# Patient Record
Sex: FEMALE | NOT HISPANIC OR LATINO | Employment: PART TIME | URBAN - METROPOLITAN AREA
[De-identification: names, ages, dates, MRNs, and addresses within clinical notes are randomized per-mention and may not be internally consistent; named-entity substitution may affect disease eponyms.]

---
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
## 2022-05-16 RX ORDER — OMEPRAZOLE 40 MG/1
CAPSULE, DELAYED RELEASE ORAL
Qty: 90 CAPSULE | Refills: 1 | Status: SHIPPED | OUTPATIENT
Start: 2022-05-16 | End: 2022-12-08

## 2022-11-28 ENCOUNTER — OFFICE VISIT (OUTPATIENT)
Dept: FAMILY MEDICINE CLINIC | Facility: CLINIC | Age: 22
End: 2022-11-28

## 2022-11-28 VITALS
BODY MASS INDEX: 21.12 KG/M2 | SYSTOLIC BLOOD PRESSURE: 104 MMHG | DIASTOLIC BLOOD PRESSURE: 66 MMHG | HEART RATE: 118 BPM | WEIGHT: 100.6 LBS | TEMPERATURE: 98 F | OXYGEN SATURATION: 100 % | HEIGHT: 58 IN

## 2022-11-28 DIAGNOSIS — H66.003 NON-RECURRENT ACUTE SUPPURATIVE OTITIS MEDIA OF BOTH EARS WITHOUT SPONTANEOUS RUPTURE OF TYMPANIC MEMBRANES: Primary | ICD-10-CM

## 2022-11-28 PROCEDURE — 99213 OFFICE O/P EST LOW 20 MIN: CPT | Performed by: NURSE PRACTITIONER

## 2022-11-28 RX ORDER — BROMPHENIRAMINE MALEATE, PSEUDOEPHEDRINE HYDROCHLORIDE, AND DEXTROMETHORPHAN HYDROBROMIDE 2; 30; 10 MG/5ML; MG/5ML; MG/5ML
SYRUP ORAL
COMMUNITY
Start: 2022-11-18 | End: 2022-12-08

## 2022-11-28 RX ORDER — BENZONATATE 100 MG/1
100 CAPSULE ORAL 3 TIMES DAILY PRN
Qty: 90 CAPSULE | Refills: 0 | Status: SHIPPED | OUTPATIENT
Start: 2022-11-28 | End: 2022-12-08

## 2022-11-28 RX ORDER — CEFDINIR 300 MG/1
300 CAPSULE ORAL 2 TIMES DAILY
Qty: 20 CAPSULE | Refills: 0 | Status: SHIPPED | OUTPATIENT
Start: 2022-11-28 | End: 2022-12-08

## 2022-11-28 NOTE — PROGRESS NOTES
"Chief Complaint  Earache (X4D. B. Sore throat. Ear drainage. Dx Flu 10D ago.)    Subjective        Latrice Sundip Magdalene presents to [facility]  History of Present Illness  She was diagnosed with the flu over a week ago and got better from that until yesterday when she woke up with a terrible headache and bilateral ear pain. Today she is stopped up and has terrible bilat. Ear pain.   Objective   Vital Signs:  /66 (BP Location: Left arm, Patient Position: Sitting, Cuff Size: Adult)   Pulse 118   Temp 98 °F (36.7 °C) (Temporal)   Ht 147.3 cm (58\")   Wt 45.6 kg (100 lb 9.6 oz)   SpO2 100%   BMI 21.03 kg/m²   Estimated body mass index is 21.03 kg/m² as calculated from the following:    Height as of this encounter: 147.3 cm (58\").    Weight as of this encounter: 45.6 kg (100 lb 9.6 oz).    BMI is within normal parameters. No other follow-up for BMI required.      Physical Exam  Constitutional:       Appearance: She is normal weight.   HENT:      Right Ear: Ear canal and external ear normal.      Left Ear: Ear canal and external ear normal.      Ears:      Comments: Bilateral TM's are red, bulging but intact.      Mouth/Throat:      Mouth: Mucous membranes are moist.   Cardiovascular:      Rate and Rhythm: Normal rate and regular rhythm.   Pulmonary:      Effort: Pulmonary effort is normal.      Breath sounds: Normal breath sounds.   Abdominal:      General: Abdomen is flat.      Palpations: Abdomen is soft.   Skin:     General: Skin is warm and dry.   Neurological:      General: No focal deficit present.      Mental Status: She is alert and oriented to person, place, and time.   Psychiatric:         Mood and Affect: Mood normal.         Behavior: Behavior normal.        Result Review :                Assessment and Plan   Diagnoses and all orders for this visit:    1. Non-recurrent acute suppurative otitis media of both ears without spontaneous rupture of tympanic membranes " (Primary)  Assessment & Plan:  Finish all of the antibiotics, and get otc decongestant and continue to take ibuprofen for pain.     Orders:  -     cefdinir (OMNICEF) 300 MG capsule; Take 1 capsule by mouth 2 (Two) Times a Day.  Dispense: 20 capsule; Refill: 0  -     benzonatate (Tessalon Perles) 100 MG capsule; Take 1 capsule by mouth 3 (Three) Times a Day As Needed for Cough.  Dispense: 90 capsule; Refill: 0           Follow Up   Return in about 2 weeks (around 12/12/2022), or if symptoms worsen or fail to improve.  Patient was given instructions and counseling regarding her condition or for health maintenance advice. Please see specific information pulled into the AVS if appropriate.

## 2022-12-08 ENCOUNTER — OFFICE VISIT (OUTPATIENT)
Dept: FAMILY MEDICINE CLINIC | Facility: CLINIC | Age: 22
End: 2022-12-08

## 2022-12-08 VITALS
WEIGHT: 100.6 LBS | TEMPERATURE: 97.1 F | DIASTOLIC BLOOD PRESSURE: 68 MMHG | HEART RATE: 64 BPM | HEIGHT: 58 IN | OXYGEN SATURATION: 100 % | BODY MASS INDEX: 21.12 KG/M2 | SYSTOLIC BLOOD PRESSURE: 100 MMHG

## 2022-12-08 DIAGNOSIS — J06.9 VIRAL UPPER RESPIRATORY TRACT INFECTION: Primary | ICD-10-CM

## 2022-12-08 PROCEDURE — 99213 OFFICE O/P EST LOW 20 MIN: CPT | Performed by: PHYSICIAN ASSISTANT

## 2022-12-08 NOTE — PROGRESS NOTES
Patient Office Visit      Patient Name: Latrice Del Castillo  : 2000   MRN: [id number]     Chief Complaint:    Chief Complaint   Patient presents with   • right ear pain       History of Present Illness: Latrice Del Castillo is a 22 y.o. female who is here today complaining of ear pain and pressure.  She had the flu about 3 weeks ago and then was treated with an antibiotic around [facility] for a worsening sore throat and ear pain.  She is getting ready to travel to [place] for 2 weeks and is concerned about possible persistent infection.  She has some scratchy throat and postnasal drainage but the mucus is all clear at this point.  She does have some persistent cough.  She also has been treated with a Medrol Dosepak already.    Subjective      Review of Systems:   Review of Systems   HENT: Positive for ear pain, hearing loss and sore throat. Negative for ear discharge and rhinorrhea.    Respiratory: Positive for cough.    Gastrointestinal: Negative for abdominal pain, diarrhea and vomiting.   Musculoskeletal: Negative for neck pain.   Skin: Negative for rash.        Past Medical History:   Past Medical History:   Diagnosis Date   • GERD (gastroesophageal reflux disease)        Past Surgical History:   Past Surgical History:   Procedure Laterality Date   • CHOLECYSTECTOMY         Family History:   Family History   Family history unknown: Yes       Social History:   Social History     Socioeconomic History   • Marital status: Single   Tobacco Use   • Smoking status: Never     Passive exposure: Never   • Smokeless tobacco: Never   Vaping Use   • Vaping Use: Never used   Substance and Sexual Activity   • Alcohol use: Never   • Drug use: Never   • Sexual activity: Defer       Allergies:   No Known Allergies    Objective     Physical Exam:  Vital Signs:   Vitals:    22 1139   BP: 100/68   BP Location: Right arm   Patient Position: Sitting   Cuff Size: Adult   Pulse: 64   Temp: 97.1 °F (36.2 °C)  "  TempSrc: Temporal   SpO2: 100%   Weight: 45.6 kg (100 lb 9.6 oz)   Height: 147.3 cm (58\")   PainSc: 0-No pain     Body mass index is 21.03 kg/m².   BMI is within normal parameters. No other follow-up for BMI required.       Physical Exam  Constitutional:       Appearance: Normal appearance.   HENT:      Right Ear: Tympanic membrane, ear canal and external ear normal.      Left Ear: Tympanic membrane, ear canal and external ear normal.      Nose: Nose normal.      Mouth/Throat:      Mouth: Mucous membranes are moist.   Eyes:      Extraocular Movements: Extraocular movements intact.      Conjunctiva/sclera: Conjunctivae normal.      Pupils: Pupils are equal, round, and reactive to light.   Musculoskeletal:      Cervical back: Normal range of motion and neck supple.   Neurological:      Mental Status: She is alert.         Procedures    Assessment / Plan      Assessment/Plan:   Diagnoses and all orders for this visit:    1. Viral upper respiratory tract infection (Primary)  -     Chlorcyclizine-Pseudoephed 25-60 MG tablet; Take 1 tablet by mouth 3 (Three) Times a Day As Needed (drainage and nasal congestion).  Dispense: 30 tablet; Refill: 1       I think she has some residual symptoms.  She is already been treated with an antibiotic for what sounds to be a secondary bacterial sinusitis and that has resolved.  She has some residual cough.  She is also already been treated with a corticosteroid.  I would recommend treating symptoms at this point.  It is possible that she has a new viral upper respiratory infection.  Prescription for Stahist given.    Medications:     Current Outpatient Medications:   •  Chlorcyclizine-Pseudoephed 25-60 MG tablet, Take 1 tablet by mouth 3 (Three) Times a Day As Needed (drainage and nasal congestion)., Disp: 30 tablet, Rfl: 1        Follow Up:   No follow-ups on file.    Dilma Gonzales PA-C   [facility]   Answers for HPI/ROS submitted by the patient on " 12/8/2022  What is the primary reason for your visit?: Ear Pain  Affected ear: both  Chronicity: chronic  Onset: 1 to 4 weeks ago  Progression since onset: waxing and waning  Frequency: every few hours  Fever: no fever  Fever duration: less than 1 day  Pain - numeric: 5/10

## 2023-02-03 ENCOUNTER — OFFICE VISIT (OUTPATIENT)
Dept: FAMILY MEDICINE CLINIC | Facility: CLINIC | Age: 23
End: 2023-02-03
Payer: MEDICAID

## 2023-02-03 VITALS
BODY MASS INDEX: 20.99 KG/M2 | RESPIRATION RATE: 15 BRPM | WEIGHT: 100 LBS | DIASTOLIC BLOOD PRESSURE: 62 MMHG | HEIGHT: 58 IN | SYSTOLIC BLOOD PRESSURE: 110 MMHG | TEMPERATURE: 99.7 F | HEART RATE: 87 BPM

## 2023-02-03 DIAGNOSIS — J02.9 SORE THROAT: Primary | ICD-10-CM

## 2023-02-03 DIAGNOSIS — J35.1 LARGE TONSILS: ICD-10-CM

## 2023-02-03 LAB
EXPIRATION DATE: NORMAL
EXPIRATION DATE: NORMAL
FLUAV AG UPPER RESP QL IA.RAPID: NOT DETECTED
FLUBV AG UPPER RESP QL IA.RAPID: NOT DETECTED
INTERNAL CONTROL: NORMAL
INTERNAL CONTROL: NORMAL
Lab: NORMAL
Lab: NORMAL
S PYO AG THROAT QL: NEGATIVE
SARS-COV-2 AG UPPER RESP QL IA.RAPID: NOT DETECTED

## 2023-02-03 PROCEDURE — 87428 SARSCOV & INF VIR A&B AG IA: CPT | Performed by: PHYSICIAN ASSISTANT

## 2023-02-03 PROCEDURE — 99213 OFFICE O/P EST LOW 20 MIN: CPT | Performed by: PHYSICIAN ASSISTANT

## 2023-02-03 PROCEDURE — 87880 STREP A ASSAY W/OPTIC: CPT | Performed by: PHYSICIAN ASSISTANT

## 2023-02-03 RX ORDER — CEPHALEXIN 500 MG/1
500 CAPSULE ORAL 3 TIMES DAILY
Qty: 30 CAPSULE | Refills: 0 | Status: SHIPPED | OUTPATIENT
Start: 2023-02-03

## 2023-02-03 NOTE — PROGRESS NOTES
"      Patient Office Visit      Patient Name: Latrice Del Castillo  : 2000   MRN: [id number]     Chief Complaint:    Chief Complaint   Patient presents with   • Sore Throat       History of Present Illness: Latrice Del Castillo is a 22 y.o. female who is here today for recheck current sore throat for the last 3 to 4 months.  She said also her tonsils were enlarged.  She is here on a Friday and said she had a fever on Monday.  Her throat is a little sore now.  She is interested in seeing an ENT regarding her tonsils.    Subjective      Review of Systems:   Review of Systems   Constitutional: Positive for chills and fever.   HENT: Positive for sore throat. Negative for congestion and rhinorrhea.    Respiratory: Negative for cough.         Past Medical History:   Past Medical History:   Diagnosis Date   • GERD (gastroesophageal reflux disease)        Past Surgical History:   Past Surgical History:   Procedure Laterality Date   • CHOLECYSTECTOMY         Family History:   Family History   Family history unknown: Yes       Social History:   Social History     Socioeconomic History   • Marital status: Single   Tobacco Use   • Smoking status: Never     Passive exposure: Never   • Smokeless tobacco: Never   Vaping Use   • Vaping Use: Never used   Substance and Sexual Activity   • Alcohol use: Never   • Drug use: Never   • Sexual activity: Defer       Allergies:   No Known Allergies    Objective     Physical Exam:  Vital Signs:   Vitals:    23 1145   BP: 110/62   BP Location: Right arm   Patient Position: Sitting   Cuff Size: Large Adult   Pulse: 87   Resp: 15   Temp: 99.7 °F (37.6 °C)   TempSrc: Temporal   Weight: 45.4 kg (100 lb)   Height: 147.3 cm (58\")     Body mass index is 20.9 kg/m².   BMI is within normal parameters. No other follow-up for BMI required.       Physical Exam  HENT:      Mouth/Throat:      Pharynx: Posterior oropharyngeal erythema ( Mild erythema) present.      Tonsils: 3+ on the right. 3+ on " the left.   Lymphadenopathy:      Cervical: Cervical adenopathy present.         Procedures    Assessment / Plan      Assessment/Plan:   Diagnoses and all orders for this visit:    1. Sore throat (Primary)  Assessment & Plan:  Negative rapid strep.  Her throat is little red and her tonsils are enlarged and she did have a fever at the beginning of the week.  We will go ahead and start Keflex and send out a throat culture and refer to ENT.    Orders:  -     POCT SARS-CoV-2 Antigen TWIN + Flu  -     cephalexin (Keflex) 500 MG capsule; Take 1 capsule by mouth 3 (Three) Times a Day.  Dispense: 30 capsule; Refill: 0  -     POC Rapid Strep A  -     Culture, Routine - Swab, Oropharynx; Future    2. Large tonsils  -     Ambulatory Referral to ENT (Otolaryngology)         Medications:     Current Outpatient Medications:   •  cephalexin (Keflex) 500 MG capsule, Take 1 capsule by mouth 3 (Three) Times a Day., Disp: 30 capsule, Rfl: 0        Follow Up:   No follow-ups on file.    Dilma Gonzales PA-C   [facility]    Essential hypertension

## 2023-02-03 NOTE — ASSESSMENT & PLAN NOTE
Negative rapid strep.  Her throat is little red and her tonsils are enlarged and she did have a fever at the beginning of the week.  We will go ahead and start Keflex and send out a throat culture and refer to ENT.

## 2023-02-07 LAB
BACTERIA SPEC RESP CULT: NORMAL
BACTERIA SPEC RESP CULT: NORMAL
SPECIMEN STATUS: NORMAL

## 2023-02-08 ENCOUNTER — TELEPHONE (OUTPATIENT)
Dept: FAMILY MEDICINE CLINIC | Facility: CLINIC | Age: 23
End: 2023-02-08

## 2023-02-08 NOTE — TELEPHONE ENCOUNTER
Caller: Latrice Del Castillo Sundip    Relationship: Self    Best call back number: [phone]  What is the best time to reach you: ANY    Who are you requesting to speak with (clinical staff, provider,  specific staff member): CLINICAL       What was the call regarding: CALLING TO CHECK ON STATUS OF REFERRAL FOR ENT DR     Do you require a callback: YES OK TO LEAVE VM

## 2023-06-26 PROBLEM — Z00.00 ROUTINE GENERAL MEDICAL EXAMINATION AT A HEALTH CARE FACILITY: Status: ACTIVE | Noted: 2023-06-26

## 2023-12-06 ENCOUNTER — OFFICE VISIT (OUTPATIENT)
Dept: FAMILY MEDICINE CLINIC | Facility: CLINIC | Age: 23
End: 2023-12-06
Payer: MEDICAID

## 2023-12-06 VITALS
HEART RATE: 84 BPM | BODY MASS INDEX: 24.35 KG/M2 | DIASTOLIC BLOOD PRESSURE: 68 MMHG | OXYGEN SATURATION: 97 % | TEMPERATURE: 98.4 F | HEIGHT: 58 IN | SYSTOLIC BLOOD PRESSURE: 96 MMHG | WEIGHT: 116 LBS

## 2023-12-06 DIAGNOSIS — N92.6 IRREGULAR PERIODS: Primary | ICD-10-CM

## 2023-12-06 PROCEDURE — 1160F RVW MEDS BY RX/DR IN RCRD: CPT | Performed by: NURSE PRACTITIONER

## 2023-12-06 PROCEDURE — 99213 OFFICE O/P EST LOW 20 MIN: CPT | Performed by: NURSE PRACTITIONER

## 2023-12-06 PROCEDURE — 1159F MED LIST DOCD IN RCRD: CPT | Performed by: NURSE PRACTITIONER

## 2023-12-06 NOTE — PROGRESS NOTES
"Chief Complaint  Menstrual Problem (Irregular cycle)    Subjective        Latrice Del Castillo presents to [facility]  Menstrual Problem     she has periods every 2-3 months. Her last period was October 5th. She is not sexually active. She is not on any birth control. The only time she was regular was when she was on contraception. She has gained 16 lbs since last year.     Objective   Vital Signs:  BP 96/68 (BP Location: Left arm, Patient Position: Sitting, Cuff Size: Adult)   Pulse 84   Temp 98.4 °F (36.9 °C) (Temporal)   Ht 147.3 cm (58\")   Wt 52.6 kg (116 lb)   SpO2 97%   BMI 24.24 kg/m²   Estimated body mass index is 24.24 kg/m² as calculated from the following:    Height as of this encounter: 147.3 cm (58\").    Weight as of this encounter: 52.6 kg (116 lb).       BMI is within normal parameters. No other follow-up for BMI required.      Physical Exam  Cardiovascular:      Rate and Rhythm: Normal rate and regular rhythm.   Pulmonary:      Effort: Pulmonary effort is normal.      Breath sounds: Normal breath sounds.   Abdominal:      General: Abdomen is flat. Bowel sounds are normal.      Palpations: Abdomen is soft.        Result Review :                   Assessment and Plan   Diagnoses and all orders for this visit:    1. Irregular periods (Primary)  -     Cancel: Ambulatory Referral to Gynecology  -     Ambulatory Referral to Gynecology             Follow Up   Return if symptoms worsen or fail to improve, for Recheck.  Patient was given instructions and counseling regarding her condition or for health maintenance advice. Please see specific information pulled into the AVS if appropriate.         "

## 2024-04-19 ENCOUNTER — PATIENT ROUNDING (BHMG ONLY) (OUTPATIENT)
Dept: OBSTETRICS AND GYNECOLOGY | Facility: CLINIC | Age: 24
End: 2024-04-19
Payer: COMMERCIAL

## 2024-04-19 ENCOUNTER — OFFICE VISIT (OUTPATIENT)
Dept: OBSTETRICS AND GYNECOLOGY | Facility: CLINIC | Age: 24
End: 2024-04-19
Payer: COMMERCIAL

## 2024-04-19 VITALS
HEIGHT: 58 IN | WEIGHT: 116.2 LBS | SYSTOLIC BLOOD PRESSURE: 112 MMHG | BODY MASS INDEX: 24.39 KG/M2 | DIASTOLIC BLOOD PRESSURE: 70 MMHG

## 2024-04-19 DIAGNOSIS — Z01.419 PAP TEST, AS PART OF ROUTINE GYNECOLOGICAL EXAMINATION: Primary | ICD-10-CM

## 2024-04-19 DIAGNOSIS — N91.3 PRIMARY OLIGOMENORRHEA: ICD-10-CM

## 2024-04-19 DIAGNOSIS — L70.0 ACNE VULGARIS: ICD-10-CM

## 2024-04-19 RX ORDER — FEXOFENADINE HCL AND PSEUDOEPHEDRINE HCI 180; 240 MG/1; MG/1
1 TABLET, EXTENDED RELEASE ORAL DAILY
COMMUNITY

## 2024-04-19 NOTE — PROGRESS NOTES
Gynecologic Annual Exam Note        Establish Care, Gynecologic Exam, and Menstrual Problem        Subjective     HPI  Latrice Del Castillo is a 23 y.o.  female who presents for annual well woman exam as a new patient. There were no changes to her medical or surgical history within the past year. Patient's last menstrual period was 2024 (within days).  Her menstruals are irregular.  The flow is medium with clots. She denies dysmenorrhea. Marital Status: single. She has never been sexually active. STD testing recommendations have been explained to the patient and she does not desire STD testing.    The patient would like to discuss the following complaints today: Patient reports prior to starting birth control pills in 2019 (for menstrual regulation and acne), she only skipped a couple of months of menstruals within a year (typically March and ). Her acne improved.  She stopped her birth control last year because she could not remember to take it daily.   She has since skipped her menstrual in , July, September, December, and 2024. Denies history of pelvic pain.    Also reporting occasional sharp pain just under her rib cage that occurs with regular breathing (present 5 years).    Additional OB/GYN History   contraceptive methods: None  Desires to: do not start contraception  Thromboembolic Disease: none  History of migraines: no  Age of menarche: 11    History of STD: no    Last Pap: never. Results: N/A HPV:  N/A  Last Completed Pap Smear       This patient has no relevant Health Maintenance data.             History of abnormal Pap smear:  n/a  Gardasil status: completed  Family history of uterine, colon, breast, or ovarian cancer: no  Performs monthly Self-Breast Exam: yes  Exercises Regularly: no formal exercise, but active  Feelings of Anxiety or Depression: yes - situational anxiety; no history of medication - controlled  Tobacco Usage?: No       Current Outpatient  Medications:     fexofenadine-pseudoephedrine (ALLEGRA-D 24) 180-240 MG per 24 hr tablet, Take 1 tablet by mouth Daily., Disp: , Rfl:     omeprazole (priLOSEC) 40 MG capsule, Take 1 capsule by mouth Daily. (Patient taking differently: Take 1 capsule by mouth Daily As Needed.), Disp: 90 capsule, Rfl: 0     Patient denies the need for medication refills today.    OB History          0    Para   0    Term   0       0    AB   0    Living   0         SAB   0    IAB   0    Ectopic   0    Molar   0    Multiple   0    Live Births   0                Health Maintenance   Topic Date Due    Annual Gynecologic Pelvic and Breast Exam  Never done    TDAP/TD VACCINES (1 - Tdap) Never done    HEPATITIS C SCREENING  Never done    PAP SMEAR  Never done    COVID-19 Vaccine (3 - 2023-24 season) 2023    ANNUAL PHYSICAL  2024    INFLUENZA VACCINE  2024    HPV VACCINES  Completed    Pneumococcal Vaccine 0-64  Aged Out       Past Medical History:   Diagnosis Date    Anxiety     GERD (gastroesophageal reflux disease)         Past Surgical History:   Procedure Laterality Date    LAPAROSCOPIC CHOLECYSTECTOMY  2021    WISDOM TOOTH EXTRACTION         The additional following portions of the patient's history were reviewed and updated as appropriate: allergies, current medications, past family history, past medical history, past social history, past surgical history, and problem list.    Review of Systems   Constitutional: Negative.    HENT: Negative.     Eyes: Negative.    Respiratory: Negative.     Cardiovascular: Negative.    Gastrointestinal: Negative.    Endocrine: Negative.    Genitourinary:  Positive for menstrual problem (irregular menstruals).   Musculoskeletal: Negative.    Skin: Negative.    Allergic/Immunologic: Negative.    Neurological: Negative.    Hematological: Negative.    Psychiatric/Behavioral: Negative.           I have reviewed and agree with the HPI, ROS, and historical information as  "entered above. Ese Nance, APRN          Objective   /70 (BP Location: Right arm, Patient Position: Sitting, Cuff Size: Adult)   Ht 147.3 cm (58\")   Wt 52.7 kg (116 lb 3.2 oz)   LMP 04/02/2024 (Within Days)   BMI 24.29 kg/m²     Physical Exam  Vitals and nursing note reviewed. Exam conducted with a chaperone present.   Constitutional:       General: She is not in acute distress.     Appearance: Normal appearance. She is well-developed. She is not ill-appearing.   Neck:      Thyroid: No thyroid mass or thyromegaly.   Pulmonary:      Effort: Pulmonary effort is normal. No respiratory distress or retractions.   Chest:      Chest wall: No mass.   Breasts:     Right: Normal. No mass, nipple discharge, skin change or tenderness.      Left: Normal. No mass, nipple discharge, skin change or tenderness.   Abdominal:      General: There is no distension.      Palpations: Abdomen is soft. Abdomen is not rigid. There is no mass.      Tenderness: There is no abdominal tenderness. There is no guarding or rebound.      Hernia: No hernia is present. There is no hernia in the left inguinal area.   Genitourinary:     General: Normal vulva.      Labia:         Right: No rash, tenderness or lesion.         Left: No rash, tenderness or lesion.       Vagina: Normal. No vaginal discharge or lesions.      Cervix: Normal.      Uterus: Normal. Not enlarged, not fixed and not tender.       Adnexa: Right adnexa normal and left adnexa normal.        Right: No mass or tenderness.          Left: No mass or tenderness.        Rectum: No external hemorrhoid.      Comments: Pediatric speculum  Musculoskeletal:      Cervical back: No muscular tenderness.   Skin:     General: Skin is warm and dry.   Neurological:      Mental Status: She is alert and oriented to person, place, and time.   Psychiatric:         Mood and Affect: Mood normal.         Behavior: Behavior normal.            Assessment and Plan    Problem List Items Addressed This " Visit    None  Visit Diagnoses       Pap test, as part of routine gynecological examination    -  Primary    Relevant Orders    LIQUID-BASED PAP SMEAR WITH HPV GENOTYPING IF ASCUS (ALLAN,COR,MAD)    Primary oligomenorrhea        Relevant Orders    US Pelvis Complete    Vitamin D,25-Hydroxy    TSH    T4, Free    Comprehensive Metabolic Panel    CBC & Differential    Hemoglobin A1c    Prolactin    Testosterone    DHEA-Sulfate    Acne vulgaris        Relevant Orders    Vitamin D,25-Hydroxy    TSH    T4, Free    Comprehensive Metabolic Panel    CBC & Differential    Hemoglobin A1c    Prolactin    Testosterone    DHEA-Sulfate            GYN annual well woman exam.   Reviewed pap guidelines.   Encouraged use of condoms for STD prevention.  Reviewed monthly self breast exams.  Instructed to call with lumps, pain, or breast discharge.    Reviewed exercise as a preventative health measures.   Reccommended Flu Vaccine in Fall of each year.  Return in about 2 weeks (around 5/3/2024) for US FU.  If all normal, will prob start patch    Ese Nance, APRN  04/19/2024

## 2024-04-19 NOTE — PROGRESS NOTES
April 19, 2024    Hello, may I speak with Latrice Del Castillo?    My name is ROSA      I am  with [facility]   [facility]  [address]  formerly [facility] [address] [phone].    Before we get started may I verify your date of birth? 2000    I am calling to officially welcome you to our practice and ask about your recent visit. Is this a good time to talk? yes    Tell me about your visit with us. What things went well?  EVERYTHING WAS GREAT, WE WERE SO WELCOMING        We're always looking for ways to make our patients' experiences even better. Do you have recommendations on ways we may improve?  no    Overall were you satisfied with your first visit to our practice? yes       I appreciate you taking the time to speak with me today. Is there anything else I can do for you? no      Thank you, and have a great day.

## 2024-04-20 LAB
25(OH)D3+25(OH)D2 SERPL-MCNC: 17.6 NG/ML (ref 30–100)
ALBUMIN SERPL-MCNC: 4.4 G/DL (ref 3.5–5.2)
ALBUMIN/GLOB SERPL: 1.5 G/DL
ALP SERPL-CCNC: 84 U/L (ref 39–117)
ALT SERPL-CCNC: 17 U/L (ref 1–33)
AST SERPL-CCNC: 15 U/L (ref 1–32)
BASOPHILS # BLD AUTO: 0.04 10*3/MM3 (ref 0–0.2)
BASOPHILS NFR BLD AUTO: 0.7 % (ref 0–1.5)
BILIRUB SERPL-MCNC: 0.4 MG/DL (ref 0–1.2)
BUN SERPL-MCNC: 12 MG/DL (ref 6–20)
BUN/CREAT SERPL: 15.6 (ref 7–25)
CALCIUM SERPL-MCNC: 9.4 MG/DL (ref 8.6–10.5)
CHLORIDE SERPL-SCNC: 106 MMOL/L (ref 98–107)
CO2 SERPL-SCNC: 22.4 MMOL/L (ref 22–29)
CREAT SERPL-MCNC: 0.77 MG/DL (ref 0.57–1)
DHEA-S SERPL-MCNC: 199 UG/DL (ref 110–431.7)
EGFRCR SERPLBLD CKD-EPI 2021: 111.3 ML/MIN/1.73
EOSINOPHIL # BLD AUTO: 0.12 10*3/MM3 (ref 0–0.4)
EOSINOPHIL NFR BLD AUTO: 2.1 % (ref 0.3–6.2)
ERYTHROCYTE [DISTWIDTH] IN BLOOD BY AUTOMATED COUNT: 13 % (ref 12.3–15.4)
GLOBULIN SER CALC-MCNC: 2.9 GM/DL
GLUCOSE SERPL-MCNC: 90 MG/DL (ref 65–99)
HBA1C MFR BLD: 5.7 % (ref 4.8–5.6)
HCT VFR BLD AUTO: 36.7 % (ref 34–46.6)
HGB BLD-MCNC: 11.9 G/DL (ref 12–15.9)
IMM GRANULOCYTES # BLD AUTO: 0.01 10*3/MM3 (ref 0–0.05)
IMM GRANULOCYTES NFR BLD AUTO: 0.2 % (ref 0–0.5)
LYMPHOCYTES # BLD AUTO: 2.47 10*3/MM3 (ref 0.7–3.1)
LYMPHOCYTES NFR BLD AUTO: 42.4 % (ref 19.6–45.3)
MCH RBC QN AUTO: 26.1 PG (ref 26.6–33)
MCHC RBC AUTO-ENTMCNC: 32.4 G/DL (ref 31.5–35.7)
MCV RBC AUTO: 80.5 FL (ref 79–97)
MONOCYTES # BLD AUTO: 0.41 10*3/MM3 (ref 0.1–0.9)
MONOCYTES NFR BLD AUTO: 7 % (ref 5–12)
NEUTROPHILS # BLD AUTO: 2.78 10*3/MM3 (ref 1.7–7)
NEUTROPHILS NFR BLD AUTO: 47.6 % (ref 42.7–76)
NRBC BLD AUTO-RTO: 0 /100 WBC (ref 0–0.2)
PLATELET # BLD AUTO: 328 10*3/MM3 (ref 140–450)
POTASSIUM SERPL-SCNC: 4 MMOL/L (ref 3.5–5.2)
PROLACTIN SERPL-MCNC: 8.6 NG/ML (ref 4.8–33.4)
PROT SERPL-MCNC: 7.3 G/DL (ref 6–8.5)
RBC # BLD AUTO: 4.56 10*6/MM3 (ref 3.77–5.28)
SODIUM SERPL-SCNC: 139 MMOL/L (ref 136–145)
T4 FREE SERPL-MCNC: 1.13 NG/DL (ref 0.93–1.7)
TESTOST SERPL-MCNC: 47 NG/DL (ref 13–71)
TSH SERPL DL<=0.005 MIU/L-ACNC: 1.07 UIU/ML (ref 0.27–4.2)
WBC # BLD AUTO: 5.83 10*3/MM3 (ref 3.4–10.8)

## 2024-04-23 LAB — REF LAB TEST METHOD: NORMAL

## 2024-04-24 RX ORDER — ERGOCALCIFEROL 1.25 MG/1
CAPSULE ORAL
Qty: 8 CAPSULE | Refills: 0 | Status: SHIPPED | OUTPATIENT
Start: 2024-04-24

## 2024-05-03 ENCOUNTER — OFFICE VISIT (OUTPATIENT)
Dept: OBSTETRICS AND GYNECOLOGY | Facility: CLINIC | Age: 24
End: 2024-05-03
Payer: COMMERCIAL

## 2024-05-03 VITALS
WEIGHT: 118.2 LBS | SYSTOLIC BLOOD PRESSURE: 96 MMHG | DIASTOLIC BLOOD PRESSURE: 58 MMHG | HEIGHT: 58 IN | BODY MASS INDEX: 24.81 KG/M2

## 2024-05-03 DIAGNOSIS — N91.3 PRIMARY OLIGOMENORRHEA: Primary | ICD-10-CM

## 2024-05-03 NOTE — PROGRESS NOTES
Chief Complaint   Patient presents with    Follow-up     U/S today         Subjective   HPI  Latrice Del Castillo is a 23 y.o. female, , her last LMP was Patient's last menstrual period was 2024 (within days)..  She presents for a follow up evaluation of Amenorrhea. The patient was last seen  2 week ago by ROSA Hussein. At that time she reported amenorrhea. She had labs drawn. The plan was  u/s next visit . Since the last visit the patient reports the plan has remained unchanged. This has been an issue in the past. The patient reports additional symptoms as none.      US was done today.   D/w pt US today is wnl.  Labs-- she started Rx vitamin D.  Reviewed A1C slightly elevated; otherwise wnl.        Additional OB/GYN History   Last Pap : 24 wnl/hpv not done  Last Completed Pap Smear            PAP SMEAR (Every 3 Years) Next due on 2024  LIQUID-BASED PAP SMEAR WITH HPV GENOTYPING IF ASCUS (ALLAN,COR,MAD)                  History of abnormal Pap smear:  never had one  Tobacco Usage?: No       Current Outpatient Medications:     ergocalciferol (ERGOCALCIFEROL) 1.25 MG (52572 UT) capsule, 1 po q week x 8 then over-the-counter dose of 1470-9430 every day, Disp: 8 capsule, Rfl: 0    fexofenadine-pseudoephedrine (ALLEGRA-D 24) 180-240 MG per 24 hr tablet, Take 1 tablet by mouth Daily., Disp: , Rfl:     omeprazole (priLOSEC) 40 MG capsule, Take 1 capsule by mouth Daily. (Patient taking differently: Take 1 capsule by mouth Daily As Needed.), Disp: 90 capsule, Rfl: 0     Past Medical History:   Diagnosis Date    Anxiety     GERD (gastroesophageal reflux disease)         Past Surgical History:   Procedure Laterality Date    LAPAROSCOPIC CHOLECYSTECTOMY  2021    WISDOM TOOTH EXTRACTION         The additional following portions of the patient's history were reviewed and updated as appropriate: allergies, current medications, past family history, past medical history, past  "social history, past surgical history, and problem list.    Review of Systems   Constitutional: Negative.    HENT: Negative.     Eyes: Negative.    Respiratory: Negative.     Cardiovascular: Negative.    Gastrointestinal: Negative.    Endocrine: Negative.    Genitourinary: Negative.    Musculoskeletal: Negative.    Skin: Negative.    Allergic/Immunologic: Negative.    Neurological: Negative.    Hematological: Negative.    Psychiatric/Behavioral: Negative.         I have reviewed and agree with the HPI, ROS, and historical information as entered above. Ese Nance, APRN      Objective   BP 96/58   Ht 147.3 cm (58\")   Wt 53.6 kg (118 lb 3.2 oz)   LMP 04/02/2024 (Within Days)   BMI 24.70 kg/m²     Physical Exam  Vitals and nursing note reviewed.   Constitutional:       General: She is not in acute distress.     Appearance: Normal appearance. She is not ill-appearing.   Pulmonary:      Effort: Pulmonary effort is normal. No respiratory distress.   Skin:     General: Skin is warm and dry.   Neurological:      Mental Status: She is alert and oriented to person, place, and time.   Psychiatric:         Mood and Affect: Mood normal.         Behavior: Behavior normal.         Assessment & Plan     Assessment     Problem List Items Addressed This Visit    None  Visit Diagnoses       Primary oligomenorrhea    -  Primary              Plan     D/w pt options of OCP, patch, Nuvaring, IUDs.  She declines for now.  Call prn no period at least every 3 months.  Rev risk of endometrial cancer if longer than 3 months; she v/u.  Call prn for Rx of any of the above.      Ese Nance, APRN  05/03/2024   "

## 2024-06-18 RX ORDER — ERGOCALCIFEROL 1.25 MG/1
CAPSULE ORAL
Qty: 8 CAPSULE | Refills: 0 | OUTPATIENT
Start: 2024-06-18

## 2025-01-02 ENCOUNTER — OFFICE VISIT (OUTPATIENT)
Dept: FAMILY MEDICINE CLINIC | Facility: CLINIC | Age: 25
End: 2025-01-02
Payer: COMMERCIAL

## 2025-01-02 VITALS
SYSTOLIC BLOOD PRESSURE: 106 MMHG | HEIGHT: 58 IN | HEART RATE: 62 BPM | OXYGEN SATURATION: 99 % | WEIGHT: 115.5 LBS | RESPIRATION RATE: 16 BRPM | BODY MASS INDEX: 24.24 KG/M2 | TEMPERATURE: 98.3 F | DIASTOLIC BLOOD PRESSURE: 60 MMHG

## 2025-01-02 DIAGNOSIS — Z00.00 ANNUAL PHYSICAL EXAM: Primary | ICD-10-CM

## 2025-01-02 DIAGNOSIS — K21.9 GASTROESOPHAGEAL REFLUX DISEASE WITHOUT ESOPHAGITIS: ICD-10-CM

## 2025-01-02 DIAGNOSIS — R09.89 SYMPTOMS OF UPPER RESPIRATORY INFECTION (URI): ICD-10-CM

## 2025-01-02 PROBLEM — R10.811 RIGHT UPPER QUADRANT ABDOMINAL TENDERNESS WITHOUT REBOUND TENDERNESS: Status: ACTIVE | Noted: 2025-01-02

## 2025-01-02 PROBLEM — R10.13 EPIGASTRIC ABDOMINAL PAIN: Status: ACTIVE | Noted: 2025-01-02

## 2025-01-02 LAB
EXPIRATION DATE: NORMAL
FLUAV AG UPPER RESP QL IA.RAPID: NOT DETECTED
FLUBV AG UPPER RESP QL IA.RAPID: NOT DETECTED
INTERNAL CONTROL: NORMAL
Lab: NORMAL
SARS-COV-2 AG UPPER RESP QL IA.RAPID: NOT DETECTED

## 2025-01-02 RX ORDER — FAMOTIDINE 20 MG/1
20 TABLET, FILM COATED ORAL 2 TIMES DAILY
Qty: 30 TABLET | Refills: 2 | Status: SHIPPED | OUTPATIENT
Start: 2025-01-02

## 2025-01-02 NOTE — ASSESSMENT & PLAN NOTE
Patient c/o GERD symptoms - heartburn/regurgitation intermittent  Denies cough, shortness of breath, sore throat, changes in taste  No dysphagia or chest pain  Previously taking omeprazole 40 mg as needed  Patient aware of food and lifestyle triggers and reports doing fairly well at avoiding these  No suspected complications-(Caro/stricture)  Recommended lifestyle modification: wgt loss, avoid meals 2 to 3 hours before bedtime  Continue avoiding or eliminating food triggers: Chocolate, caffeine, EtOH, acid/spicy foods  Patient education materials attached to AVS   We discussed possible long-term complications related to PPI and patient agrees she may have adequate relief with H2 blocker medication-patient agrees to try this    Plan:  Famotidine 20 mg twice daily as needed for GERD  Patient agrees to follow-up if poor efficacy  Follow-up as needed or at routine wellness visits

## 2025-01-02 NOTE — ASSESSMENT & PLAN NOTE
"c/o of URI symptoms x 5 days - see HPI  Point of care testing negative for influenza and COVID  Symptoms consistent with seasonal allergies/viral URI  Complaining of bilateral ear pain and fullness-bilateral effusion, no fevers, chills, or erythema noted  Also c/o swollen gum tissue at right lower jaw wisdom tooth extraction site.  Mild erythema and swelling noted-recommended she follow-up with dental provider to evaluate for possible fb or other  Lack of antibiotic effectiveness discussed with her.   Patient education materials attached to AVS - home management of respiratory infection  Patient reports poor daily fluid intake-emphasized importance of adequate hydration     Plan:  - Increase fluid intake  - Stahist x 10 days-re-emphasized the importance of increasing fluid intake that while she is taking antihistamine  - Continue warm salt water \"swishes\" for sore gum-follow-up with dental provider to evaluate for cause of inflamed gums   - Pain control/ analgesia: Tylenol/ibuprofen  - Rx antihistamine sent to pt pharmacy - see orders  - Return to clinic if no improvement or symptoms continue to get worse over the next several days    Latrice verbalizes understanding if she develops high fever, shortness of breath, chest pain, and/or other alarm symptoms she  should go immediately to the ER or call 911.  "

## 2025-01-02 NOTE — PROGRESS NOTES
New Patient Office Visit      Date: 2025  Patient Name: Latrice Del Castillo  : 2000   MRN: [id number]     Chief Complaint   Patient presents with    Establish Care     Patient states that her gums are swollen by her wisdom teeth    Ear Fullness            History of Present Illness: Latrice Del Castillo is a 24 y.o. female who is here today for a get acquainted visit to establish care with a new provider and to complete annual wellness exam.  She tells me main concerns today are establishing with new provider and issues with earache and mild swelling and pain in her lower jaw-wisdom tooth extraction site.  Patient denies any other new or recent illness or injury. Denies falls, unexplained weight change, fevers, chills, or other constitutional symptoms. I have reviewed Latrice's medical history with her  and updated the computerized patient record. Baseline screening tests were discussed today and she agrees to discuss health maintenance and goals in future appointments.    Est [facility]. Periods  Issue resolved - attributed to weaning from birth control  No birth control   c/o ear ache since  Sore throat, cough since weather changes     Subjective     Past Medical History:   Diagnosis Date    Anxiety     GERD (gastroesophageal reflux disease)        Past Surgical History:   Procedure Laterality Date    LAPAROSCOPIC CHOLECYSTECTOMY  2021    WISDOM TOOTH EXTRACTION  2019       Family History   Problem Relation Age of Onset    Parkinsonism Father     Movement disorder Father     Thyroid disease Mother     Prostate cancer Paternal Grandfather     Stroke Paternal Grandfather     Arthritis Paternal Grandmother     Diabetes Paternal Grandmother     Thyroid disease Paternal Grandmother     Arthritis Maternal Grandmother     Hearing loss Maternal Grandfather     Hyperlipidemia Maternal Grandfather     Thyroid disease Maternal Grandfather     Diabetes Maternal Grandfather          "borderline    Breast cancer Neg Hx     Colon cancer Neg Hx     Ovarian cancer Neg Hx     Colon polyps Neg Hx        Social History     Socioeconomic History    Marital status: Single   Tobacco Use    Smoking status: Never     Passive exposure: Never    Smokeless tobacco: Never   Vaping Use    Vaping status: Never Used   Substance and Sexual Activity    Alcohol use: Not Currently    Drug use: Never    Sexual activity: Never     Birth control/protection: Abstinence         Current Outpatient Medications   Medication Instructions    Chlorcyclizine-Pseudoephed 25-60 MG tablet 1 tablet, Oral, 3 Times Daily PRN    famotidine (ZANTAC 360 MAX [address]) 20 mg, Oral, 2 Times Daily    fexofenadine-pseudoephedrine (ALLEGRA-D 24) 180-240 MG per 24 hr tablet 1 tablet, Daily          No Known Allergies        Objective     Vitals:    01/02/25 0935   BP: 106/60   BP Location: Right arm   Patient Position: Sitting   Cuff Size: Adult   Pulse: 62   Resp: 16   Temp: 98.3 °F (36.8 °C)   TempSrc: Oral   SpO2: 99%   Weight: 52.4 kg (115 lb 8 oz)   Height: 147.3 cm (58\")   PainSc:   5   PainLoc: Ear          Physical Exam  Vitals and nursing note reviewed.   Constitutional:       General: She is not in acute distress.     Appearance: She is not ill-appearing.   HENT:      Head: Normocephalic.      Right Ear: Tympanic membrane normal.      Left Ear: Tympanic membrane normal.      Nose: Nose normal.      Mouth/Throat:      Mouth: Mucous membranes are moist.   Eyes:      Pupils: Pupils are equal, round, and reactive to light.   Neck:      Thyroid: No thyromegaly or thyroid tenderness.   Cardiovascular:      Rate and Rhythm: Normal rate and regular rhythm.      Pulses: Normal pulses.      Heart sounds: Normal heart sounds.   Pulmonary:      Effort: Pulmonary effort is normal.      Breath sounds: Normal breath sounds.   Abdominal:      General: Bowel sounds are normal.      Palpations: Abdomen is soft.   Musculoskeletal:      Right lower leg: No " edema.      Left lower leg: No edema.   Lymphadenopathy:      Cervical: No cervical adenopathy.   Skin:     General: Skin is warm and dry.      Capillary Refill: Capillary refill takes less than 2 seconds.   Neurological:      Mental Status: She is alert and oriented to person, place, and time.   Psychiatric:         Mood and Affect: Mood normal.         Behavior: Behavior normal.         PHQ-2 Depression Screening  Little interest or pleasure in doing things? Not at all   Feeling down, depressed, or hopeless? Not at all   PHQ-2 Total Score 0        Assessment / Plan      Diagnoses and all orders for this visit:    1. Annual physical exam (Primary)  Assessment & Plan:  Latrice Sundip Magdalene  here for establish care visit and annual wellness exam  States main concerns: Swelling left lower gum and mouth, earache, and GERD  Available medical records reviewed and discussed with her today.   Appropriate diagnostic testing ordered.  Medication refills, if applicable, provided per patient request.  Patient agrees to follow-up in 2 to 4 weeks to discuss diagnostic testing results, health goals, and address care gaps.     Orders:  -     TSH Rfx On Abnormal To Free T4  -     Lipid Panel  -     Hemoglobin A1c  -     Comprehensive Metabolic Panel  -     CBC Auto Differential    2. Gastroesophageal reflux disease without esophagitis  Assessment & Plan:  Patient c/o GERD symptoms - heartburn/regurgitation intermittent  Denies cough, shortness of breath, sore throat, changes in taste  No dysphagia or chest pain  Previously taking omeprazole 40 mg as needed  Patient aware of food and lifestyle triggers and reports doing fairly well at avoiding these  No suspected complications-(Caro/stricture)  Recommended lifestyle modification: wgt loss, avoid meals 2 to 3 hours before bedtime  Continue avoiding or eliminating food triggers: Chocolate, caffeine, EtOH, acid/spicy foods  Patient education materials attached to AVS   We discussed  "possible long-term complications related to PPI and patient agrees she may have adequate relief with H2 blocker medication-patient agrees to try this    Plan:  Famotidine 20 mg twice daily as needed for GERD  Patient agrees to follow-up if poor efficacy  Follow-up as needed or at routine wellness visits    Orders:  -     famotidine (Zantac 360 Max [address]) 20 MG tablet; Take 1 tablet by mouth 2 (Two) Times a Day.  Dispense: 30 tablet; Refill: 2    3. Symptoms of upper respiratory infection (URI)  Assessment & Plan:  c/o of URI symptoms x 5 days - see HPI  Point of care testing negative for influenza and COVID  Symptoms consistent with seasonal allergies/viral URI  Complaining of bilateral ear pain and fullness-bilateral effusion, no fevers, chills, or erythema noted  Also c/o swollen gum tissue at right lower jaw wisdom tooth extraction site.  Mild erythema and swelling noted-recommended she follow-up with dental provider to evaluate for possible fb or other  Lack of antibiotic effectiveness discussed with her.   Patient education materials attached to AVS - home management of respiratory infection  Patient reports poor daily fluid intake-emphasized importance of adequate hydration     Plan:  - Increase fluid intake  - Stahist x 10 days-re-emphasized the importance of increasing fluid intake that while she is taking antihistamine  - Continue warm salt water \"swishes\" for sore gum-follow-up with dental provider to evaluate for cause of inflamed gums   - Pain control/ analgesia: Tylenol/ibuprofen  - Rx antihistamine sent to pt pharmacy - see orders  - Return to clinic if no improvement or symptoms continue to get worse over the next several days    Latrice verbalizes understanding if she develops high fever, shortness of breath, chest pain, and/or other alarm symptoms she  should go immediately to the ER or call 911.    Orders:  -     POCT SARS-CoV-2 + Flu Antigen TWIN    Other orders  -     Chlorcyclizine-Pseudoephed 25-60 " MG tablet; Take 1 tablet by mouth 3 (Three) Times a Day As Needed (drainage and nasal congestion).  Dispense: 60 tablet; Refill: 1       Patient Education:   Reviewed medications, potential side effects and signs and symptoms to report.   Discussed risk vs benefits of treatment plan with patient including medications, labs, and imaging.    Patient education materials attached to AVS and reviewed with patient during office visit today.   Addressed questions and concerns during visit. Patient verbalized understanding and agrees with tx plan.   Instructed to call the office with any questions and report to ER with any life-threatening symptoms.    Return if symptoms worsen or fail to improve, for Annual physical, Patient will need labs before next visit please.    ROSA Perdomo (Libby) [facility]  [address] [address] [phone]    NOTE TO PATIENT:   The 21st Century Cures Act makes medical notes like these available to patients in the interest of transparency. However, be advised this is a medical document. It is intended as peer to peer communication. It is written in medical language and may contain abbreviations or verbiage that are unfamiliar. It may appear blunt or direct. Medical documents are intended to carry relevant information, facts as evident, and the clinical opinion of the practitioner.     EMR Dragon/Transcription disclaimer: Much of this encounter note is an electronic transcription of spoken language to printed text. Electronic transcription of spoken language may permit erroneous, or at times, nonsensical words or phrases to be inadvertently transcribed. Although I have reviewed the note for such errors, some may still exist.

## 2025-01-02 NOTE — ASSESSMENT & PLAN NOTE
Latrice Del Castillo  here for establish care visit and annual wellness exam  States main concerns: Swelling left lower gum and mouth, earache, and GERD  Available medical records reviewed and discussed with her today.   Appropriate diagnostic testing ordered.  Medication refills, if applicable, provided per patient request.  Patient agrees to follow-up in 2 to 4 weeks to discuss diagnostic testing results, health goals, and address care gaps.

## 2025-01-02 NOTE — PATIENT INSTRUCTIONS
Health Maintenance, Female  Adopting a healthy lifestyle and getting preventive care can go a long way to promote health and wellness. Talk with your health care provider about what schedule of regular examinations is right for you. This is a good chance for you to check in with your provider about disease prevention and staying healthy.  In between checkups, there are plenty of things you can do on your own. Experts have done a lot of research about which lifestyle changes and preventive measures are most likely to keep you healthy. Ask your health care provider for more information.  Weight and diet  Eat a healthy diet  Be sure to include plenty of vegetables, fruits, low-fat dairy products, and lean protein.  Do not eat a lot of foods high in solid fats, added sugars, or salt.  Get regular exercise. This is one of the most important things you can do for your health.  Most adults should exercise for at least 150 minutes each week. The exercise should increase your heart rate and make you sweat (moderate-intensity exercise).  Most adults should also do strengthening exercises at least twice a week. This is in addition to the moderate-intensity exercise.     Maintain a healthy weight  Body mass index (BMI) is a measurement that can be used to identify possible weight problems. It estimates body fat based on height and weight. Your health care provider can help determine your BMI and help you achieve or maintain a healthy weight.  For females 20 years of age and older:  A BMI below 18.5 is considered underweight.  A BMI of 18.5 to 24.9 is normal.  A BMI of 25 to 29.9 is considered overweight.  A BMI of 30 and above is considered obese.     Watch levels of cholesterol and blood lipids  You should start having your blood tested for lipids and cholesterol at 20 years of age, then have this test every 5 years.  You may need to have your cholesterol levels checked more often if:  Your lipid or cholesterol levels are  high.  You are older than 50 years of age.  You are at high risk for heart disease.     Cancer screening  Lung Cancer  Lung cancer screening is recommended for adults 55-80 years old who are at high risk for lung cancer because of a history of smoking.  A yearly low-dose CT scan of the lungs is recommended for people who:  Currently smoke.  Have quit within the past 15 years.  Have at least a 30-pack-year history of smoking. A pack year is smoking an average of one pack of cigarettes a day for 1 year.  Yearly screening should continue until it has been 15 years since you quit.  Yearly screening should stop if you develop a health problem that would prevent you from having lung cancer treatment.     Breast Cancer  Practice breast self-awareness. This means understanding how your breasts normally appear and feel.  It also means doing regular breast self-exams. Let your health care provider know about any changes, no matter how small.  If you are in your 20s or 30s, you should have a clinical breast exam (CBE) by a health care provider every 1-3 years as part of a regular health exam.  If you are 40 or older, have a CBE every year. Also consider having a breast X-ray (mammogram) every year.  If you have a family history of breast cancer, talk to your health care provider about genetic screening.  If you are at high risk for breast cancer, talk to your health care provider about having an MRI and a mammogram every year.  Breast cancer gene (BRCA) assessment is recommended for women who have family members with BRCA-related cancers. BRCA-related cancers include:  Breast.  Ovarian.  Tubal.  Peritoneal cancers.  Results of the assessment will determine the need for genetic counseling and BRCA1 and BRCA2 testing.     Cervical Cancer  Your health care provider may recommend that you be screened regularly for cancer of the pelvic organs (ovaries, uterus, and vagina). This screening involves a pelvic examination, including  checking for microscopic changes to the surface of your cervix (Pap test). You may be encouraged to have this screening done every 3 years, beginning at age 21.  For women ages 30-65, health care providers may recommend pelvic exams and Pap testing every 3 years, or they may recommend the Pap and pelvic exam, combined with testing for human papilloma virus (HPV), every 5 years. Some types of HPV increase your risk of cervical cancer. Testing for HPV may also be done on women of any age with unclear Pap test results.  Other health care providers may not recommend any screening for nonpregnant women who are considered low risk for pelvic cancer and who do not have symptoms. Ask your health care provider if a screening pelvic exam is right for you.  If you have had past treatment for cervical cancer or a condition that could lead to cancer, you need Pap tests and screening for cancer for at least 20 years after your treatment. If Pap tests have been discontinued, your risk factors (such as having a new sexual partner) need to be reassessed to determine if screening should resume. Some women have medical problems that increase the chance of getting cervical cancer. In these cases, your health care provider may recommend more frequent screening and Pap tests.     Colorectal Cancer  This type of cancer can be detected and often prevented.  Routine colorectal cancer screening usually begins at 50 years of age and continues through 75 years of age.  Your health care provider may recommend screening at an earlier age if you have risk factors for colon cancer.  Your health care provider may also recommend using home test kits to check for hidden blood in the stool.  A small camera at the end of a tube can be used to examine your colon directly (sigmoidoscopy or colonoscopy). This is done to check for the earliest forms of colorectal cancer.  Routine screening usually begins at age 50.  Direct examination of the colon should  be repeated every 5-10 years through 75 years of age. However, you may need to be screened more often if early forms of precancerous polyps or small growths are found.     Skin Cancer  Check your skin from head to toe regularly.  Tell your health care provider about any new moles or changes in moles, especially if there is a change in a mole's shape or color.  Also tell your health care provider if you have a mole that is larger than the size of a pencil eraser.  Always use sunscreen. Apply sunscreen liberally and repeatedly throughout the day.  Protect yourself by wearing long sleeves, pants, a wide-brimmed hat, and sunglasses whenever you are outside.     Heart disease, diabetes, and high blood pressure  High blood pressure causes heart disease and increases the risk of stroke. High blood pressure is more likely to develop in:  People who have blood pressure in the high end of the normal range (130-139/85-89 mm Hg).  People who are overweight or obese.  People who are .  If you are 18-39 years of age, have your blood pressure checked every 3-5 years. If you are 40 years of age or older, have your blood pressure checked every year. You should have your blood pressure measured twice--once when you are at a hospital or clinic, and once when you are not at a hospital or clinic. Record the average of the two measurements. To check your blood pressure when you are not at a hospital or clinic, you can use:  An automated blood pressure machine at a pharmacy.  A home blood pressure monitor.  If you are between 55 years and 79 years old, ask your health care provider if you should take aspirin to prevent strokes.  Have regular diabetes screenings. This involves taking a blood sample to check your fasting blood sugar level.  If you are at a normal weight and have a low risk for diabetes, have this test once every three years after 45 years of age.  If you are overweight and have a high risk for diabetes,  consider being tested at a younger age or more often.  Preventing infection  Hepatitis B  If you have a higher risk for hepatitis B, you should be screened for this virus. You are considered at high risk for hepatitis B if:  You were born in a country where hepatitis B is common. Ask your health care provider which countries are considered high risk.  Your parents were born in a high-risk country, and you have not been immunized against hepatitis B (hepatitis B vaccine).  You have HIV or AIDS.  You use needles to inject street drugs.  You live with someone who has hepatitis B.  You have had sex with someone who has hepatitis B.  You get hemodialysis treatment.  You take certain medicines for conditions, including cancer, organ transplantation, and autoimmune conditions.     Hepatitis C  Blood testing is recommended for:  Everyone born from 1945 through 1965.  Anyone with known risk factors for hepatitis C.     Sexually transmitted infections (STIs)  You should be screened for sexually transmitted infections (STIs) including gonorrhea and chlamydia if:  You are sexually active and are younger than 24 years of age.  You are older than 24 years of age and your health care provider tells you that you are at risk for this type of infection.  Your sexual activity has changed since you were last screened and you are at an increased risk for chlamydia or gonorrhea. Ask your health care provider if you are at risk.  If you do not have HIV, but are at risk, it may be recommended that you take a prescription medicine daily to prevent HIV infection. This is called pre-exposure prophylaxis (PrEP). You are considered at risk if:  You are sexually active and do not regularly use condoms or know the HIV status of your partner(s).  You take drugs by injection.  You are sexually active with a partner who has HIV.     Talk with your health care provider about whether you are at high risk of being infected with HIV. If you choose to  begin PrEP, you should first be tested for HIV. You should then be tested every 3 months for as long as you are taking PrEP.  Pregnancy  If you are premenopausal and you may become pregnant, ask your health care provider about preconception counseling.  If you may become pregnant, take 400 to 800 micrograms (mcg) of folic acid every day.  If you want to prevent pregnancy, talk to your health care provider about birth control (contraception).  Osteoporosis and menopause  Osteoporosis is a disease in which the bones lose minerals and strength with aging. This can result in serious bone fractures. Your risk for osteoporosis can be identified using a bone density scan.  If you are 65 years of age or older, or if you are at risk for osteoporosis and fractures, ask your health care provider if you should be screened.  Ask your health care provider whether you should take a calcium or vitamin D supplement to lower your risk for osteoporosis.  Menopause may have certain physical symptoms and risks.  Hormone replacement therapy may reduce some of these symptoms and risks.  Talk to your health care provider about whether hormone replacement therapy is right for you.  Follow these instructions at home:  Schedule regular health, dental, and eye exams.  Stay current with your immunizations.  Do not use any tobacco products including cigarettes, chewing tobacco, or electronic cigarettes.  If you are pregnant, do not drink alcohol.  If you are breastfeeding, limit how much and how often you drink alcohol.  Limit alcohol intake to no more than 1 drink per day for nonpregnant women. One drink equals 12 ounces of beer, 5 ounces of wine, or 1½ ounces of hard liquor.  Do not use street drugs.  Do not share needles.  Ask your health care provider for help if you need support or information about quitting drugs.  Tell your health care provider if you often feel depressed.  Tell your health care provider if you have ever been abused or do  not feel safe at home.  This information is not intended to replace advice given to you by your health care provider. Make sure you discuss any questions you have with your health care provider.  Document Released: 07/02/2012 Document Revised: 05/25/2017 Document Reviewed: 09/20/2016  [employer] Patient Education © 2018 [employer]

## 2025-01-03 LAB
ALBUMIN SERPL-MCNC: 4.3 G/DL (ref 4–5)
ALP SERPL-CCNC: 90 IU/L (ref 44–121)
ALT SERPL-CCNC: 16 IU/L (ref 0–32)
AST SERPL-CCNC: 14 IU/L (ref 0–40)
BASOPHILS # BLD AUTO: 0 X10E3/UL (ref 0–0.2)
BASOPHILS NFR BLD AUTO: 1 %
BILIRUB SERPL-MCNC: 0.3 MG/DL (ref 0–1.2)
BUN SERPL-MCNC: 9 MG/DL (ref 6–20)
BUN/CREAT SERPL: 12 (ref 9–23)
CALCIUM SERPL-MCNC: 9.6 MG/DL (ref 8.7–10.2)
CHLORIDE SERPL-SCNC: 107 MMOL/L (ref 96–106)
CHOLEST SERPL-MCNC: 188 MG/DL (ref 100–199)
CO2 SERPL-SCNC: 22 MMOL/L (ref 20–29)
CREAT SERPL-MCNC: 0.78 MG/DL (ref 0.57–1)
EGFRCR SERPLBLD CKD-EPI 2021: 109 ML/MIN/1.73
EOSINOPHIL # BLD AUTO: 0.1 X10E3/UL (ref 0–0.4)
EOSINOPHIL NFR BLD AUTO: 2 %
ERYTHROCYTE [DISTWIDTH] IN BLOOD BY AUTOMATED COUNT: 13.9 % (ref 11.7–15.4)
GLOBULIN SER CALC-MCNC: 2.9 G/DL (ref 1.5–4.5)
GLUCOSE SERPL-MCNC: 93 MG/DL (ref 70–99)
HBA1C MFR BLD: 5.8 % (ref 4.8–5.6)
HCT VFR BLD AUTO: 39.6 % (ref 34–46.6)
HDLC SERPL-MCNC: 53 MG/DL
HGB BLD-MCNC: 12.7 G/DL (ref 11.1–15.9)
IMM GRANULOCYTES # BLD AUTO: 0 X10E3/UL (ref 0–0.1)
IMM GRANULOCYTES NFR BLD AUTO: 1 %
LDLC SERPL CALC-MCNC: 124 MG/DL (ref 0–99)
LYMPHOCYTES # BLD AUTO: 1.9 X10E3/UL (ref 0.7–3.1)
LYMPHOCYTES NFR BLD AUTO: 30 %
MCH RBC QN AUTO: 26.1 PG (ref 26.6–33)
MCHC RBC AUTO-ENTMCNC: 32.1 G/DL (ref 31.5–35.7)
MCV RBC AUTO: 81 FL (ref 79–97)
MONOCYTES # BLD AUTO: 0.5 X10E3/UL (ref 0.1–0.9)
MONOCYTES NFR BLD AUTO: 7 %
NEUTROPHILS # BLD AUTO: 3.9 X10E3/UL (ref 1.4–7)
NEUTROPHILS NFR BLD AUTO: 59 %
PLATELET # BLD AUTO: 334 X10E3/UL (ref 150–450)
POTASSIUM SERPL-SCNC: 4 MMOL/L (ref 3.5–5.2)
PROT SERPL-MCNC: 7.2 G/DL (ref 6–8.5)
RBC # BLD AUTO: 4.87 X10E6/UL (ref 3.77–5.28)
SODIUM SERPL-SCNC: 141 MMOL/L (ref 134–144)
TRIGL SERPL-MCNC: 58 MG/DL (ref 0–149)
TSH SERPL DL<=0.005 MIU/L-ACNC: 0.79 UIU/ML (ref 0.45–4.5)
VLDLC SERPL CALC-MCNC: 11 MG/DL (ref 5–40)
WBC # BLD AUTO: 6.5 X10E3/UL (ref 3.4–10.8)